# Patient Record
Sex: MALE | Race: WHITE | Employment: OTHER | URBAN - METROPOLITAN AREA
[De-identification: names, ages, dates, MRNs, and addresses within clinical notes are randomized per-mention and may not be internally consistent; named-entity substitution may affect disease eponyms.]

---

## 2023-08-11 ENCOUNTER — TELEPHONE (OUTPATIENT)
Dept: CARDIAC REHAB | Age: 73
End: 2023-08-11

## 2023-08-11 NOTE — TELEPHONE ENCOUNTER
Called  Dada Maria Fernanda for reminder of initial card rehab appointment on 8/14/2023 at 11:15. No answer but left message.

## 2023-08-14 ENCOUNTER — HOSPITAL ENCOUNTER (OUTPATIENT)
Dept: CARDIAC REHAB | Age: 73
Setting detail: THERAPIES SERIES
Discharge: HOME | End: 2023-08-14
Payer: MEDICARE

## 2023-08-14 PROCEDURE — 93798 PHYS/QHP OP CAR RHAB W/ECG: CPT

## 2023-08-14 RX ORDER — GABAPENTIN 100 MG/1
2 CAPSULE ORAL 3 TIMES DAILY
COMMUNITY
Start: 2021-07-22

## 2023-08-14 RX ORDER — GEMFIBROZIL 600 MG/1
600 TABLET, FILM COATED ORAL
COMMUNITY
Start: 2023-05-09

## 2023-08-14 RX ORDER — RANOLAZINE 500 MG/1
500 TABLET, EXTENDED RELEASE ORAL 2 TIMES DAILY
COMMUNITY
Start: 2023-05-09

## 2023-08-14 RX ORDER — ALLOPURINOL 300 MG/1
400 TABLET ORAL
COMMUNITY
Start: 2021-08-30

## 2023-08-14 RX ORDER — FERROUS SULFATE 325(65) MG
TABLET ORAL
COMMUNITY
Start: 2020-11-30

## 2023-08-14 RX ORDER — SPIRONOLACTONE 25 MG/1
1 TABLET ORAL DAILY
COMMUNITY
Start: 2020-09-17

## 2023-08-14 RX ORDER — HYDROCODONE BITARTRATE AND ACETAMINOPHEN 5; 325 MG/1; MG/1
TABLET ORAL
COMMUNITY

## 2023-08-14 RX ORDER — ICOSAPENT ETHYL 1000 MG/1
CAPSULE ORAL
COMMUNITY
Start: 2021-09-10

## 2023-08-14 RX ORDER — AMLODIPINE BESYLATE 5 MG/1
5 TABLET ORAL DAILY
COMMUNITY
Start: 2023-05-09

## 2023-08-14 RX ORDER — M-VIT,TX,IRON,MINS/CALC/FOLIC 27MG-0.4MG
1 TABLET ORAL DAILY
COMMUNITY

## 2023-08-14 RX ORDER — OMEPRAZOLE 20 MG/1
CAPSULE, DELAYED RELEASE ORAL
COMMUNITY
Start: 2018-04-03

## 2023-08-14 RX ORDER — ASPIRIN 81 MG/1
TABLET ORAL
COMMUNITY
Start: 2019-08-28

## 2023-08-14 RX ORDER — ASCORBIC ACID 500 MG
500 TABLET ORAL DAILY
COMMUNITY

## 2023-08-14 RX ORDER — CETIRIZINE HYDROCHLORIDE 10 MG/1
10 TABLET ORAL DAILY
COMMUNITY

## 2023-08-14 SDOH — HEALTH STABILITY: PHYSICAL HEALTH: ON AVERAGE, HOW MANY DAYS PER WEEK DO YOU ENGAGE IN MODERATE TO STRENUOUS EXERCISE (LIKE A BRISK WALK)?: 5 DAYS

## 2023-08-14 SDOH — ECONOMIC STABILITY: HOUSING INSECURITY
IN THE LAST 12 MONTHS, WAS THERE A TIME WHEN YOU DID NOT HAVE A STEADY PLACE TO SLEEP OR SLEPT IN A SHELTER (INCLUDING NOW)?: NO

## 2023-08-14 SDOH — ECONOMIC STABILITY: FOOD INSECURITY: WITHIN THE PAST 12 MONTHS, THE FOOD YOU BOUGHT JUST DIDN'T LAST AND YOU DIDN'T HAVE MONEY TO GET MORE.: NEVER TRUE

## 2023-08-14 SDOH — ECONOMIC STABILITY: TRANSPORTATION INSECURITY
IN THE PAST 12 MONTHS, HAS THE LACK OF TRANSPORTATION KEPT YOU FROM MEDICAL APPOINTMENTS OR FROM GETTING MEDICATIONS?: NO

## 2023-08-14 SDOH — ECONOMIC STABILITY: INCOME INSECURITY: IN THE LAST 12 MONTHS, WAS THERE A TIME WHEN YOU WERE NOT ABLE TO PAY THE MORTGAGE OR RENT ON TIME?: NO

## 2023-08-14 SDOH — ECONOMIC STABILITY: TRANSPORTATION INSECURITY
IN THE PAST 12 MONTHS, HAS LACK OF TRANSPORTATION KEPT YOU FROM MEETINGS, WORK, OR FROM GETTING THINGS NEEDED FOR DAILY LIVING?: NO

## 2023-08-14 SDOH — ECONOMIC STABILITY: FOOD INSECURITY: WITHIN THE PAST 12 MONTHS, YOU WORRIED THAT YOUR FOOD WOULD RUN OUT BEFORE YOU GOT MONEY TO BUY MORE.: NEVER TRUE

## 2023-08-14 SDOH — ECONOMIC STABILITY: HOUSING INSECURITY: IN THE LAST 12 MONTHS, HOW MANY PLACES HAVE YOU LIVED?: 1

## 2023-08-14 SDOH — HEALTH STABILITY: PHYSICAL HEALTH: ON AVERAGE, HOW MANY MINUTES DO YOU ENGAGE IN EXERCISE AT THIS LEVEL?: 30 MIN

## 2023-08-14 ASSESSMENT — SOCIAL DETERMINANTS OF HEALTH (SDOH)
HOW HARD IS IT FOR YOU TO PAY FOR THE VERY BASICS LIKE FOOD, HOUSING, MEDICAL CARE, AND HEATING?: NOT HARD AT ALL
WITHIN THE LAST YEAR, HAVE YOU BEEN HUMILIATED OR EMOTIONALLY ABUSED IN OTHER WAYS BY YOUR PARTNER OR EX-PARTNER?: NO
HOW OFTEN DO YOU ATTEND CHURCH OR RELIGIOUS SERVICES?: MORE THAN 4 TIMES PER YEAR
IN A TYPICAL WEEK, HOW MANY TIMES DO YOU TALK ON THE PHONE WITH FAMILY, FRIENDS, OR NEIGHBORS?: MORE THAN THREE TIMES A WEEK
WITHIN THE LAST YEAR, HAVE YOU BEEN AFRAID OF YOUR PARTNER OR EX-PARTNER?: NO
WITHIN THE LAST YEAR, HAVE YOU BEEN KICKED, HIT, SLAPPED, OR OTHERWISE PHYSICALLY HURT BY YOUR PARTNER OR EX-PARTNER?: NO
HOW OFTEN DO YOU ATTENT MEETINGS OF THE CLUB OR ORGANIZATION YOU BELONG TO?: MORE THAN 4 TIMES PER YEAR
WITHIN THE LAST YEAR, HAVE TO BEEN RAPED OR FORCED TO HAVE ANY KIND OF SEXUAL ACTIVITY BY YOUR PARTNER OR EX-PARTNER?: NO
HOW OFTEN DO YOU GET TOGETHER WITH FRIENDS OR RELATIVES?: MORE THAN THREE TIMES A WEEK
DO YOU BELONG TO ANY CLUBS OR ORGANIZATIONS SUCH AS CHURCH GROUPS UNIONS, FRATERNAL OR ATHLETIC GROUPS, OR SCHOOL GROUPS?: YES

## 2023-08-14 ASSESSMENT — PATIENT HEALTH QUESTIONNAIRE - PHQ9
9. THOUGHTS THAT YOU WOULD BE BETTER OFF DEAD, OR OF HURTING YOURSELF: 0
SUM OF ALL RESPONSES TO PHQ QUESTIONS 1-9: 0
SUM OF ALL RESPONSES TO PHQ QUESTIONS 1-9: 0
3. TROUBLE FALLING OR STAYING ASLEEP: 0
SUM OF ALL RESPONSES TO PHQ QUESTIONS 1-9: 0
8. MOVING OR SPEAKING SO SLOWLY THAT OTHER PEOPLE COULD HAVE NOTICED. OR THE OPPOSITE, BEING SO FIGETY OR RESTLESS THAT YOU HAVE BEEN MOVING AROUND A LOT MORE THAN USUAL: 0
SUM OF ALL RESPONSES TO PHQ9 QUESTIONS 1 & 2: 0
10. IF YOU CHECKED OFF ANY PROBLEMS, HOW DIFFICULT HAVE THESE PROBLEMS MADE IT FOR YOU TO DO YOUR WORK, TAKE CARE OF THINGS AT HOME, OR GET ALONG WITH OTHER PEOPLE: 0
SUM OF ALL RESPONSES TO PHQ QUESTIONS 1-9: 0
6. FEELING BAD ABOUT YOURSELF - OR THAT YOU ARE A FAILURE OR HAVE LET YOURSELF OR YOUR FAMILY DOWN: 0
7. TROUBLE CONCENTRATING ON THINGS, SUCH AS READING THE NEWSPAPER OR WATCHING TELEVISION: 0
1. LITTLE INTEREST OR PLEASURE IN DOING THINGS: 0
2. FEELING DOWN, DEPRESSED OR HOPELESS: 0
5. POOR APPETITE OR OVEREATING: 0
4. FEELING TIRED OR HAVING LITTLE ENERGY: 0

## 2023-08-14 ASSESSMENT — LIFESTYLE VARIABLES
HOW OFTEN DO YOU HAVE A DRINK CONTAINING ALCOHOL: NEVER
HOW MANY STANDARD DRINKS CONTAINING ALCOHOL DO YOU HAVE ON A TYPICAL DAY: PATIENT DOES NOT DRINK
SMOKELESS_TOBACCO: NO

## 2023-08-14 ASSESSMENT — EJECTION FRACTION: EF_VALUE: 65

## 2023-08-14 ASSESSMENT — EXERCISE STRESS TEST
PEAK_HR: 91
PEAK_BP: 148/78

## 2023-08-14 NOTE — PLAN OF CARE
Cardiopulmonary Rehab Treatment Plan   Name: Den Suarez Assessment Date: 2023   : 1950 Primary Diagnosis: Angina    Age: 68 y.o. Referring Physician:  Lea Hobson   MRN: [id number] Primary Care Physician: No primary care provider on file. Treatment Diagnosis  Treatment Diagnosis 1: Angina  Referral Date: 23  Significant Cardiovascular History: Previous CABG; History of heart failure  Co-morbidities: Ulcer disease; Renal disease    Individual Treatment Plan  ITP Visit Type: Initial assessment  1st Date of Exercise : 23  Visit #/Total Visits:   EF%: 65 %  Risk Stratification: Moderate  ITP: Exercise; Psychosocial; Nutrition; Education    Exercise   Brandon Total Score: 15  Test: Six minute walk test    Data Measured Before Walk  Heart Rate: 75  Blood Pressure: 118/78  O2 Saturation: 95  O2 Device: Room air    Data Measured Immediately After Walk  Distance Walked in : ft  Distance Walked (ft): 1265 ft  Peak Heart Rate: 91  Peak Blood Pressure: 148/78  Modified He Scale: 3  RPE: 12  O2 Saturation: 91    Data Measured at 5 Minutes After Walk  Heart Rate: 71  Blood Pressure: 122/78  SpO2: 92 %  O2 Device: Room air    Exercise Prescription  Mode: Track; Treadmill; Bike; Stepper; Ergometer; Elliptical; Rower  Frequency per week: 3 days  Duration Per Session: 20-32+ minutes  Intensity METS      : 2-3  RPE: 11-13  Resistance Training: Yes    Exercise Education  Education: RPE scale; Physically active; Warm up/cool down; Equipment orientation; Signs/symptoms to report;  Exercise safety    Psychosocial  [facility] Total Score: 22  PHQ-9 Total Score: 0  Social Determinants of Health     Tobacco Use: Medium Risk    Smoking Tobacco Use: Former    Smokeless Tobacco Use: Never    Passive Exposure: Not on file   Alcohol Use: Not At Risk    Frequency of Alcohol Consumption: Never    Average Number of Drinks: Patient does not drink    Frequency of Binge Drinking: Never   Financial Resource

## 2023-08-16 ENCOUNTER — HOSPITAL ENCOUNTER (OUTPATIENT)
Dept: CARDIAC REHAB | Age: 73
Setting detail: THERAPIES SERIES
Discharge: HOME | End: 2023-08-16
Payer: MEDICARE

## 2023-08-16 PROCEDURE — 93798 PHYS/QHP OP CAR RHAB W/ECG: CPT

## 2023-08-18 ENCOUNTER — APPOINTMENT (OUTPATIENT)
Dept: CARDIAC REHAB | Age: 73
End: 2023-08-18
Payer: MEDICARE

## 2023-08-18 ENCOUNTER — HOSPITAL ENCOUNTER (OUTPATIENT)
Dept: CARDIAC REHAB | Age: 73
Setting detail: THERAPIES SERIES
Discharge: HOME | End: 2023-08-18
Payer: MEDICARE

## 2023-08-18 PROCEDURE — 93798 PHYS/QHP OP CAR RHAB W/ECG: CPT

## 2023-08-21 ENCOUNTER — HOSPITAL ENCOUNTER (OUTPATIENT)
Dept: CARDIAC REHAB | Age: 73
Setting detail: THERAPIES SERIES
Discharge: HOME | End: 2023-08-21
Payer: MEDICARE

## 2023-08-21 ENCOUNTER — APPOINTMENT (OUTPATIENT)
Dept: CARDIAC REHAB | Age: 73
End: 2023-08-21
Payer: MEDICARE

## 2023-08-21 PROCEDURE — 93798 PHYS/QHP OP CAR RHAB W/ECG: CPT

## 2023-08-23 ENCOUNTER — APPOINTMENT (OUTPATIENT)
Dept: CARDIAC REHAB | Age: 73
End: 2023-08-23
Payer: MEDICARE

## 2023-08-23 ENCOUNTER — HOSPITAL ENCOUNTER (OUTPATIENT)
Dept: CARDIAC REHAB | Age: 73
Setting detail: THERAPIES SERIES
Discharge: HOME | End: 2023-08-23
Payer: MEDICARE

## 2023-08-23 PROCEDURE — 93798 PHYS/QHP OP CAR RHAB W/ECG: CPT

## 2023-08-25 ENCOUNTER — APPOINTMENT (OUTPATIENT)
Dept: CARDIAC REHAB | Age: 73
End: 2023-08-25
Payer: MEDICARE

## 2023-08-25 ENCOUNTER — HOSPITAL ENCOUNTER (OUTPATIENT)
Dept: CARDIAC REHAB | Age: 73
Setting detail: THERAPIES SERIES
Discharge: HOME | End: 2023-08-25
Payer: MEDICARE

## 2023-08-25 VITALS — HEIGHT: 71 IN

## 2023-08-25 PROCEDURE — 93798 PHYS/QHP OP CAR RHAB W/ECG: CPT

## 2023-08-28 ENCOUNTER — APPOINTMENT (OUTPATIENT)
Dept: CARDIAC REHAB | Age: 73
End: 2023-08-28
Payer: MEDICARE

## 2023-08-28 ENCOUNTER — HOSPITAL ENCOUNTER (OUTPATIENT)
Dept: CARDIAC REHAB | Age: 73
Setting detail: THERAPIES SERIES
Discharge: HOME | End: 2023-08-28
Payer: MEDICARE

## 2023-08-28 PROCEDURE — 93798 PHYS/QHP OP CAR RHAB W/ECG: CPT

## 2023-08-30 ENCOUNTER — HOSPITAL ENCOUNTER (OUTPATIENT)
Dept: CARDIAC REHAB | Age: 73
Setting detail: THERAPIES SERIES
Discharge: HOME | End: 2023-08-30
Payer: MEDICARE

## 2023-08-30 ENCOUNTER — APPOINTMENT (OUTPATIENT)
Dept: CARDIAC REHAB | Age: 73
End: 2023-08-30
Payer: MEDICARE

## 2023-08-30 PROCEDURE — 93798 PHYS/QHP OP CAR RHAB W/ECG: CPT

## 2023-09-01 ENCOUNTER — APPOINTMENT (OUTPATIENT)
Dept: CARDIAC REHAB | Age: 73
End: 2023-09-01
Payer: MEDICARE

## 2023-09-01 ENCOUNTER — HOSPITAL ENCOUNTER (OUTPATIENT)
Dept: CARDIAC REHAB | Age: 73
Setting detail: THERAPIES SERIES
Discharge: HOME | End: 2023-09-01
Payer: MEDICARE

## 2023-09-01 PROCEDURE — 93798 PHYS/QHP OP CAR RHAB W/ECG: CPT

## 2023-09-04 ENCOUNTER — APPOINTMENT (OUTPATIENT)
Dept: CARDIAC REHAB | Age: 73
End: 2023-09-04
Payer: MEDICARE

## 2023-09-06 ENCOUNTER — APPOINTMENT (OUTPATIENT)
Dept: CARDIAC REHAB | Age: 73
End: 2023-09-06
Payer: MEDICARE

## 2023-09-08 ENCOUNTER — HOSPITAL ENCOUNTER (OUTPATIENT)
Dept: CARDIAC REHAB | Age: 73
Setting detail: THERAPIES SERIES
Discharge: HOME | End: 2023-09-08
Payer: MEDICARE

## 2023-09-08 ENCOUNTER — APPOINTMENT (OUTPATIENT)
Dept: CARDIAC REHAB | Age: 73
End: 2023-09-08
Payer: MEDICARE

## 2023-09-11 ENCOUNTER — HOSPITAL ENCOUNTER (OUTPATIENT)
Dept: CARDIAC REHAB | Age: 73
Setting detail: THERAPIES SERIES
Discharge: HOME | End: 2023-09-11
Payer: MEDICARE

## 2023-09-11 ENCOUNTER — APPOINTMENT (OUTPATIENT)
Dept: CARDIAC REHAB | Age: 73
End: 2023-09-11
Payer: MEDICARE

## 2023-09-11 PROCEDURE — 93798 PHYS/QHP OP CAR RHAB W/ECG: CPT

## 2023-09-13 ENCOUNTER — HOSPITAL ENCOUNTER (OUTPATIENT)
Dept: CARDIAC REHAB | Age: 73
Setting detail: THERAPIES SERIES
Discharge: HOME | End: 2023-09-13
Payer: MEDICARE

## 2023-09-13 ENCOUNTER — APPOINTMENT (OUTPATIENT)
Dept: CARDIAC REHAB | Age: 73
End: 2023-09-13
Payer: MEDICARE

## 2023-09-13 PROCEDURE — 93798 PHYS/QHP OP CAR RHAB W/ECG: CPT

## 2023-09-15 ENCOUNTER — HOSPITAL ENCOUNTER (OUTPATIENT)
Dept: CARDIAC REHAB | Age: 73
Setting detail: THERAPIES SERIES
Discharge: HOME | End: 2023-09-15
Payer: MEDICARE

## 2023-09-15 ENCOUNTER — APPOINTMENT (OUTPATIENT)
Dept: CARDIAC REHAB | Age: 73
End: 2023-09-15
Payer: MEDICARE

## 2023-09-15 PROCEDURE — 93798 PHYS/QHP OP CAR RHAB W/ECG: CPT

## 2023-09-18 ENCOUNTER — APPOINTMENT (OUTPATIENT)
Dept: CARDIAC REHAB | Age: 73
End: 2023-09-18
Payer: MEDICARE

## 2023-09-18 ENCOUNTER — HOSPITAL ENCOUNTER (OUTPATIENT)
Dept: CARDIAC REHAB | Age: 73
Setting detail: THERAPIES SERIES
Discharge: HOME | End: 2023-09-18
Payer: MEDICARE

## 2023-09-18 PROCEDURE — 93798 PHYS/QHP OP CAR RHAB W/ECG: CPT

## 2023-09-20 ENCOUNTER — APPOINTMENT (OUTPATIENT)
Dept: CARDIAC REHAB | Age: 73
End: 2023-09-20
Payer: MEDICARE

## 2023-09-20 ENCOUNTER — HOSPITAL ENCOUNTER (OUTPATIENT)
Dept: CARDIAC REHAB | Age: 73
Setting detail: THERAPIES SERIES
Discharge: HOME | End: 2023-09-20
Payer: MEDICARE

## 2023-09-20 PROCEDURE — 93798 PHYS/QHP OP CAR RHAB W/ECG: CPT

## 2023-09-22 ENCOUNTER — HOSPITAL ENCOUNTER (OUTPATIENT)
Dept: CARDIAC REHAB | Age: 73
Setting detail: THERAPIES SERIES
Discharge: HOME | End: 2023-09-22
Payer: MEDICARE

## 2023-09-22 ENCOUNTER — APPOINTMENT (OUTPATIENT)
Dept: CARDIAC REHAB | Age: 73
End: 2023-09-22
Payer: MEDICARE

## 2023-09-22 PROCEDURE — 93798 PHYS/QHP OP CAR RHAB W/ECG: CPT

## 2023-09-25 ENCOUNTER — HOSPITAL ENCOUNTER (OUTPATIENT)
Dept: CARDIAC REHAB | Age: 73
Setting detail: THERAPIES SERIES
Discharge: HOME | End: 2023-09-25
Payer: MEDICARE

## 2023-09-25 ENCOUNTER — APPOINTMENT (OUTPATIENT)
Dept: CARDIAC REHAB | Age: 73
End: 2023-09-25
Payer: MEDICARE

## 2023-09-25 PROCEDURE — 93798 PHYS/QHP OP CAR RHAB W/ECG: CPT

## 2023-09-27 ENCOUNTER — APPOINTMENT (OUTPATIENT)
Dept: CARDIAC REHAB | Age: 73
End: 2023-09-27
Payer: MEDICARE

## 2023-09-27 ENCOUNTER — HOSPITAL ENCOUNTER (OUTPATIENT)
Dept: CARDIAC REHAB | Age: 73
Setting detail: THERAPIES SERIES
Discharge: HOME | End: 2023-09-27
Payer: MEDICARE

## 2023-09-27 PROCEDURE — 93798 PHYS/QHP OP CAR RHAB W/ECG: CPT

## 2023-09-28 NOTE — PLAN OF CARE
Risk factors; Understand target guidelines for lipids    Staff Treatment Goals  Goals: Weight management; Exercise  Exercise Goal: 150 minutes of aerobic exercise per week  Exercise Goal Status: Progressing (attends rehab regularly.)  Nutrition Goal: less fattening foods  Nutrition Goal Status: Progressing  Nutrition Goal Comments: 3 servings of vegetables and fruit per day and one serving of fish per week  Weight Managment Goal: 220 lb  Weight Managment Goal Status: Not Met  Weight Management Goal Comments: 5 pounds in 6 weeks (goal 30 lbs weight loss)  Weight Management Goal Assessment: Problems/barriers (frequently eating out.)              Provider Review and Approval of this ITP    The above treatment plan and goals have been set for your patient during Cardiac Rehabilitation.  Please review and Electronically Cosign        Electronically signed by Chriss Odell RN on 9/28/23 at 8:51 AM [facility]

## 2023-09-29 ENCOUNTER — APPOINTMENT (OUTPATIENT)
Dept: CARDIAC REHAB | Age: 73
End: 2023-09-29
Payer: MEDICARE

## 2023-09-29 ENCOUNTER — HOSPITAL ENCOUNTER (OUTPATIENT)
Dept: CARDIAC REHAB | Age: 73
Setting detail: THERAPIES SERIES
Discharge: HOME | End: 2023-09-29
Payer: MEDICARE

## 2023-09-29 PROCEDURE — 93798 PHYS/QHP OP CAR RHAB W/ECG: CPT

## 2023-10-02 ENCOUNTER — HOSPITAL ENCOUNTER (OUTPATIENT)
Dept: CARDIAC REHAB | Age: 73
Setting detail: THERAPIES SERIES
Discharge: HOME | End: 2023-10-02
Payer: MEDICARE

## 2023-10-02 ENCOUNTER — APPOINTMENT (OUTPATIENT)
Dept: CARDIAC REHAB | Age: 73
End: 2023-10-02
Payer: MEDICARE

## 2023-10-02 PROCEDURE — 93798 PHYS/QHP OP CAR RHAB W/ECG: CPT

## 2023-10-04 ENCOUNTER — APPOINTMENT (OUTPATIENT)
Dept: CARDIAC REHAB | Age: 73
End: 2023-10-04
Payer: MEDICARE

## 2023-10-04 ENCOUNTER — HOSPITAL ENCOUNTER (OUTPATIENT)
Dept: CARDIAC REHAB | Age: 73
Setting detail: THERAPIES SERIES
End: 2023-10-04
Payer: MEDICARE

## 2023-10-06 ENCOUNTER — APPOINTMENT (OUTPATIENT)
Dept: CARDIAC REHAB | Age: 73
End: 2023-10-06
Payer: MEDICARE

## 2023-10-06 ENCOUNTER — HOSPITAL ENCOUNTER (OUTPATIENT)
Dept: CARDIAC REHAB | Age: 73
Setting detail: THERAPIES SERIES
Discharge: HOME | End: 2023-10-06
Payer: MEDICARE

## 2023-10-06 PROCEDURE — 93798 PHYS/QHP OP CAR RHAB W/ECG: CPT

## 2023-10-09 ENCOUNTER — APPOINTMENT (OUTPATIENT)
Dept: CARDIAC REHAB | Age: 73
End: 2023-10-09
Payer: MEDICARE

## 2023-10-09 ENCOUNTER — HOSPITAL ENCOUNTER (OUTPATIENT)
Dept: CARDIAC REHAB | Age: 73
Setting detail: THERAPIES SERIES
Discharge: HOME | End: 2023-10-09
Payer: MEDICARE

## 2023-10-09 PROCEDURE — 93798 PHYS/QHP OP CAR RHAB W/ECG: CPT

## 2023-10-11 ENCOUNTER — HOSPITAL ENCOUNTER (OUTPATIENT)
Dept: CARDIAC REHAB | Age: 73
Setting detail: THERAPIES SERIES
Discharge: HOME | End: 2023-10-11
Payer: MEDICARE

## 2023-10-11 ENCOUNTER — APPOINTMENT (OUTPATIENT)
Dept: CARDIAC REHAB | Age: 73
End: 2023-10-11
Payer: MEDICARE

## 2023-10-11 PROCEDURE — 93798 PHYS/QHP OP CAR RHAB W/ECG: CPT

## 2023-10-13 ENCOUNTER — HOSPITAL ENCOUNTER (OUTPATIENT)
Dept: CARDIAC REHAB | Age: 73
Setting detail: THERAPIES SERIES
Discharge: HOME | End: 2023-10-13
Payer: MEDICARE

## 2023-10-13 ENCOUNTER — APPOINTMENT (OUTPATIENT)
Dept: CARDIAC REHAB | Age: 73
End: 2023-10-13
Payer: MEDICARE

## 2023-10-13 PROCEDURE — 93798 PHYS/QHP OP CAR RHAB W/ECG: CPT

## 2023-10-16 ENCOUNTER — HOSPITAL ENCOUNTER (OUTPATIENT)
Dept: CARDIAC REHAB | Age: 73
Setting detail: THERAPIES SERIES
Discharge: HOME | End: 2023-10-16
Payer: MEDICARE

## 2023-10-16 ENCOUNTER — APPOINTMENT (OUTPATIENT)
Dept: CARDIAC REHAB | Age: 73
End: 2023-10-16
Payer: MEDICARE

## 2023-10-16 PROCEDURE — 93798 PHYS/QHP OP CAR RHAB W/ECG: CPT

## 2023-10-18 ENCOUNTER — HOSPITAL ENCOUNTER (OUTPATIENT)
Dept: CARDIAC REHAB | Age: 73
Setting detail: THERAPIES SERIES
Discharge: HOME | End: 2023-10-18
Payer: MEDICARE

## 2023-10-18 ENCOUNTER — APPOINTMENT (OUTPATIENT)
Dept: CARDIAC REHAB | Age: 73
End: 2023-10-18
Payer: MEDICARE

## 2023-10-18 PROCEDURE — 93798 PHYS/QHP OP CAR RHAB W/ECG: CPT

## 2023-10-20 ENCOUNTER — HOSPITAL ENCOUNTER (OUTPATIENT)
Dept: CARDIAC REHAB | Age: 73
Setting detail: THERAPIES SERIES
Discharge: HOME | End: 2023-10-20
Payer: MEDICARE

## 2023-10-20 ENCOUNTER — APPOINTMENT (OUTPATIENT)
Dept: CARDIAC REHAB | Age: 73
End: 2023-10-20
Payer: MEDICARE

## 2023-10-20 PROCEDURE — 93798 PHYS/QHP OP CAR RHAB W/ECG: CPT

## 2023-10-23 ENCOUNTER — APPOINTMENT (OUTPATIENT)
Dept: CARDIAC REHAB | Age: 73
End: 2023-10-23
Payer: MEDICARE

## 2023-10-23 ENCOUNTER — HOSPITAL ENCOUNTER (OUTPATIENT)
Dept: CARDIAC REHAB | Age: 73
Setting detail: THERAPIES SERIES
Discharge: HOME | End: 2023-10-23
Payer: MEDICARE

## 2023-10-23 PROCEDURE — 93798 PHYS/QHP OP CAR RHAB W/ECG: CPT

## 2023-10-23 NOTE — PLAN OF CARE
Cardiopulmonary Rehab Treatment Plan   Name: Sydney Nunez Assessment Date: 10/23/2023   : 1950 Primary Diagnosis: Treatment Diagnosis 1: Angina      Age: 68 y.o. Referring Physician:  Jero Rehmanherb   MRN: [id number] Primary Care Physician: No primary care provider on file. Treatment Diagnosis  Treatment Diagnosis 1: Angina  Referral Date: 23  Significant Cardiovascular History: Previous CABG; History of heart failure  Co-morbidities: Ulcer disease; Renal disease    Individual Treatment Plan  ITP Visit Type: Re-assessment  1st Date of Exercise : 23  ITP Next Review Date: 10/23/23  Visit #/Total Visits: 27/36  EF%: 65 %  Risk Stratification: Moderate  ITP: Exercise; Psychosocial; Nutrition; Education    Exercise   Stages of Change: Action  Assisted Devices: None  Brandon Total Score: 15  Test: Six minute walk test    Data Measured Before Walk  Heart Rate: 75  Blood Pressure: 118/78  O2 Saturation: 95  O2 Device: Room air    Data Measured Immediately After Walk  Distance Walked in : ft  Distance Walked (ft): 1265 ft  Peak Heart Rate: 91  Peak Blood Pressure: 148/78  Modified He Scale: 3  RPE: 12  O2 Saturation: 91    Data Measured at 5 Minutes After Walk  Heart Rate: 71  Blood Pressure: 122/78  SpO2: 92 %  O2 Device: Room air    Exercise Prescription  Mode: Track; Treadmill; Bike; Stepper; Ergometer; Elliptical; Rower  Frequency per week: 2-3 supervised sessions weekly  Duration Per Session: 30-45+ minutes of steady aerobic exercise  Intensity METS      : 3-6 (Increase workloads 0.5-1.0 METS/weekly)  RPE: 12-14  Progression: patient tolerates low levels of exercise well in target frequency, duration, and intensity. Max MET level: 2.8. patient getting closer to goal MET level.   Resistance Training: Yes    Exercise Blood Pressures  Resting BP: 118/64  Is BP WDL: Yes    Exercise Activity at Home  Type: walks  Frequency: daily  Duration: 30 min  Resistance Training: Yes    Exercise

## 2023-10-25 ENCOUNTER — APPOINTMENT (OUTPATIENT)
Dept: CARDIAC REHAB | Age: 73
End: 2023-10-25
Payer: MEDICARE

## 2023-10-25 ENCOUNTER — HOSPITAL ENCOUNTER (OUTPATIENT)
Dept: CARDIAC REHAB | Age: 73
Setting detail: THERAPIES SERIES
Discharge: HOME | End: 2023-10-25
Payer: MEDICARE

## 2023-10-25 PROCEDURE — 93798 PHYS/QHP OP CAR RHAB W/ECG: CPT

## 2023-10-27 ENCOUNTER — APPOINTMENT (OUTPATIENT)
Dept: CARDIAC REHAB | Age: 73
End: 2023-10-27
Payer: MEDICARE

## 2023-10-30 ENCOUNTER — HOSPITAL ENCOUNTER (OUTPATIENT)
Dept: CARDIAC REHAB | Age: 73
Setting detail: THERAPIES SERIES
Discharge: HOME | End: 2023-10-30
Payer: MEDICARE

## 2023-10-30 ENCOUNTER — APPOINTMENT (OUTPATIENT)
Dept: CARDIAC REHAB | Age: 73
End: 2023-10-30
Payer: MEDICARE

## 2023-10-30 PROCEDURE — 93798 PHYS/QHP OP CAR RHAB W/ECG: CPT

## 2023-11-01 ENCOUNTER — HOSPITAL ENCOUNTER (OUTPATIENT)
Dept: CARDIAC REHAB | Age: 73
Setting detail: THERAPIES SERIES
Discharge: HOME | End: 2023-11-01
Payer: MEDICARE

## 2023-11-01 ENCOUNTER — APPOINTMENT (OUTPATIENT)
Dept: CARDIAC REHAB | Age: 73
End: 2023-11-01
Payer: MEDICARE

## 2023-11-01 PROCEDURE — 93798 PHYS/QHP OP CAR RHAB W/ECG: CPT

## 2023-11-03 ENCOUNTER — APPOINTMENT (OUTPATIENT)
Dept: CARDIAC REHAB | Age: 73
End: 2023-11-03
Payer: MEDICARE

## 2023-11-03 ENCOUNTER — HOSPITAL ENCOUNTER (OUTPATIENT)
Dept: CARDIAC REHAB | Age: 73
Setting detail: THERAPIES SERIES
Discharge: HOME | End: 2023-11-03
Payer: MEDICARE

## 2023-11-03 PROCEDURE — 93798 PHYS/QHP OP CAR RHAB W/ECG: CPT

## 2023-11-06 ENCOUNTER — HOSPITAL ENCOUNTER (OUTPATIENT)
Dept: CARDIAC REHAB | Age: 73
Setting detail: THERAPIES SERIES
Discharge: HOME | End: 2023-11-06
Payer: MEDICARE

## 2023-11-06 ENCOUNTER — APPOINTMENT (OUTPATIENT)
Dept: CARDIAC REHAB | Age: 73
End: 2023-11-06
Payer: MEDICARE

## 2023-11-06 PROCEDURE — 93798 PHYS/QHP OP CAR RHAB W/ECG: CPT

## 2023-11-08 ENCOUNTER — APPOINTMENT (OUTPATIENT)
Dept: CARDIAC REHAB | Age: 73
End: 2023-11-08
Payer: MEDICARE

## 2023-11-08 ENCOUNTER — HOSPITAL ENCOUNTER (OUTPATIENT)
Dept: CARDIAC REHAB | Age: 73
Setting detail: THERAPIES SERIES
Discharge: HOME | End: 2023-11-08
Payer: MEDICARE

## 2023-11-08 PROCEDURE — 93798 PHYS/QHP OP CAR RHAB W/ECG: CPT

## 2023-11-10 ENCOUNTER — APPOINTMENT (OUTPATIENT)
Dept: CARDIAC REHAB | Age: 73
End: 2023-11-10
Payer: MEDICARE

## 2023-11-13 ENCOUNTER — APPOINTMENT (OUTPATIENT)
Dept: CARDIAC REHAB | Age: 73
End: 2023-11-13
Payer: MEDICARE

## 2023-11-15 ENCOUNTER — HOSPITAL ENCOUNTER (OUTPATIENT)
Dept: CARDIAC REHAB | Age: 73
Setting detail: THERAPIES SERIES
Discharge: HOME | End: 2023-11-15
Payer: MEDICARE

## 2023-11-15 ENCOUNTER — APPOINTMENT (OUTPATIENT)
Dept: CARDIAC REHAB | Age: 73
End: 2023-11-15
Payer: MEDICARE

## 2023-11-15 PROCEDURE — 93798 PHYS/QHP OP CAR RHAB W/ECG: CPT

## 2023-11-17 ENCOUNTER — APPOINTMENT (OUTPATIENT)
Dept: CARDIAC REHAB | Age: 73
End: 2023-11-17
Payer: MEDICARE

## 2023-11-17 ENCOUNTER — HOSPITAL ENCOUNTER (OUTPATIENT)
Dept: CARDIAC REHAB | Age: 73
Setting detail: THERAPIES SERIES
Discharge: HOME | End: 2023-11-17
Payer: MEDICARE

## 2023-11-17 PROCEDURE — 93798 PHYS/QHP OP CAR RHAB W/ECG: CPT

## 2023-11-17 ASSESSMENT — PATIENT HEALTH QUESTIONNAIRE - PHQ9
SUM OF ALL RESPONSES TO PHQ QUESTIONS 1-9: 0
4. FEELING TIRED OR HAVING LITTLE ENERGY: 0
SUM OF ALL RESPONSES TO PHQ9 QUESTIONS 1 & 2: 0
SUM OF ALL RESPONSES TO PHQ QUESTIONS 1-9: 0
10. IF YOU CHECKED OFF ANY PROBLEMS, HOW DIFFICULT HAVE THESE PROBLEMS MADE IT FOR YOU TO DO YOUR WORK, TAKE CARE OF THINGS AT HOME, OR GET ALONG WITH OTHER PEOPLE: 0
SUM OF ALL RESPONSES TO PHQ QUESTIONS 1-9: 0
6. FEELING BAD ABOUT YOURSELF - OR THAT YOU ARE A FAILURE OR HAVE LET YOURSELF OR YOUR FAMILY DOWN: 0
8. MOVING OR SPEAKING SO SLOWLY THAT OTHER PEOPLE COULD HAVE NOTICED. OR THE OPPOSITE, BEING SO FIGETY OR RESTLESS THAT YOU HAVE BEEN MOVING AROUND A LOT MORE THAN USUAL: 0
7. TROUBLE CONCENTRATING ON THINGS, SUCH AS READING THE NEWSPAPER OR WATCHING TELEVISION: 0
5. POOR APPETITE OR OVEREATING: 0
2. FEELING DOWN, DEPRESSED OR HOPELESS: 0
3. TROUBLE FALLING OR STAYING ASLEEP: 0
9. THOUGHTS THAT YOU WOULD BE BETTER OFF DEAD, OR OF HURTING YOURSELF: 0
SUM OF ALL RESPONSES TO PHQ QUESTIONS 1-9: 0
1. LITTLE INTEREST OR PLEASURE IN DOING THINGS: 0

## 2023-11-20 ENCOUNTER — HOSPITAL ENCOUNTER (OUTPATIENT)
Dept: CARDIAC REHAB | Age: 73
Setting detail: THERAPIES SERIES
Discharge: HOME | End: 2023-11-20
Payer: MEDICARE

## 2023-11-20 ENCOUNTER — APPOINTMENT (OUTPATIENT)
Dept: CARDIAC REHAB | Age: 73
End: 2023-11-20
Payer: MEDICARE

## 2023-11-20 NOTE — CARDIO/PULMONARY
Pt called and stated that they have tested positive for COVID and would not be into pulmonary rehab. Pt stated that their 5 day period would be over 11/21/2023.      Electronically signed by Abdiel Gates on 11/20/2023 at 7:34 AM

## 2023-11-20 NOTE — PLAN OF CARE
Understand target guidelines for B/P  Patient Stated Education Goals: pt states he is compliant with his BP meds and knows how to check BP at home. Patient Education   Education: Risk factors  Hypertension: Yes  Hypertension Controlled: Yes  Is BP WDL: Yes  BP Meds: metoprolol tartrate, spiranolactone    ASA Prescribed: Yes  ASA Adherent: Yes  Antiplatelet Prescribed: No  BB Prescribed: Yes  BB Adherent: Yes  Statins Prescribed: No  Education Target Goals  Target Goals: Risk factors; Understand target guidelines for B/P  Patient Stated Education Goals: pt states he is compliant with his BP meds and knows how to check BP at home. Staff Treatment Goals  Goals: Weight management; Exercise  Exercise Goal: Understand appropriate RPE levels. Exercise Goal Status: Progressing  Nutrition Goal: Discuss label interpretation and begin reading nutrition labels while shopping. Nutrition Goal Status: Progressing  Nutrition Goal Comments: 3 servings of vegetables and fruit per day and one serving of fish per week  Weight Managment Goal: 220 lb  Weight Managment Goal Status: Not Met  Weight Management Goal Comments: 5 pounds in 6 weeks (goal 30 lbs weight loss)  Weight Management Goal Assessment: Problems/barriers              Provider Review and Approval of this ITP    The above treatment plan and goals have been set for your patient during Cardiac Rehabilitation.  Please review and Electronically Cosign        Electronically signed by Omaira Doss RN on 11/20/23 at 8:03 AM EST

## 2023-11-22 ENCOUNTER — HOSPITAL ENCOUNTER (OUTPATIENT)
Dept: CARDIAC REHAB | Age: 73
Setting detail: THERAPIES SERIES
Discharge: HOME | End: 2023-11-22
Payer: MEDICARE

## 2023-11-22 ENCOUNTER — APPOINTMENT (OUTPATIENT)
Dept: CARDIAC REHAB | Age: 73
End: 2023-11-22
Payer: MEDICARE

## 2023-11-24 ENCOUNTER — APPOINTMENT (OUTPATIENT)
Dept: CARDIAC REHAB | Age: 73
End: 2023-11-24
Payer: MEDICARE

## 2023-11-27 ENCOUNTER — HOSPITAL ENCOUNTER (OUTPATIENT)
Dept: CARDIAC REHAB | Age: 73
Setting detail: THERAPIES SERIES
Discharge: HOME | End: 2023-11-27
Payer: MEDICARE

## 2023-11-27 ENCOUNTER — APPOINTMENT (OUTPATIENT)
Dept: CARDIAC REHAB | Age: 73
End: 2023-11-27
Payer: MEDICARE

## 2023-11-27 PROCEDURE — 93798 PHYS/QHP OP CAR RHAB W/ECG: CPT

## 2023-11-27 ASSESSMENT — EXERCISE STRESS TEST
PEAK_RPE: 16
PEAK_HR: 113
PEAK_RPD: 4
PEAK_BP: 164/80

## 2023-11-27 NOTE — PLAN OF CARE
Cardiopulmonary Rehab Treatment Plan   Name: Wyatt Taylor Assessment Date: 2023   : 1950 Primary Diagnosis: Treatment Diagnosis 1: Angina      Age: 68 y.o. Referring Physician:  Vero Powers   MRN: [id number] Primary Care Physician: No primary care provider on file. Treatment Diagnosis  Treatment Diagnosis 1: Angina  Referral Date: 23  Significant Cardiovascular History: Previous CABG; History of heart failure  Co-morbidities: Ulcer disease; Renal disease    Individual Treatment Plan  ITP Visit Type: Discharge, completed program  1st Date of Exercise : 23  ITP Next Review Date: 23  Visit #/Total Visits: 36/36  EF%: 65 %  Risk Stratification: Moderate  ITP: Exercise; Psychosocial; Nutrition; Education    Exercise   Stages of Change: Action  Assisted Devices: None  Brandon Total Score: 15  Test: Six minute walk test    Data Measured Before Walk  Heart Rate: 72  Blood Pressure: 120/60  O2 Saturation: 96  O2 Device: Room air    Data Measured during Walk  Indicate Mode of RPE: 6 minutes  Symptoms: SOB  Peak RPE: 16  Peak RPD: 4    Data Measured Immediately After Walk  Distance Walked in : ft  Distance Walked (ft): 1438 ft  Peak Heart Rate: 113  Peak Blood Pressure: 164/80  Modified He Scale: 4  RPE: 16  O2 Saturation: 91    Data Measured at 5 Minutes After Walk  Heart Rate: 86  Blood Pressure: 132/60  SpO2: 92 %  O2 Device: Room air    Exercise Prescription  Mode: Track; Treadmill; Bike; Stepper; Ergometer; Elliptical; Rower  Frequency per week: 2-3 supervised sessions weekly  Duration Per Session: 30-45+ minutes of steady aerobic exercise  Intensity METS      : 3-6 (Increase workloads 0.5-1.0 METS/weekly)  RPE: 12-14  Progression: patient tolerates low levels of exercise well in target frequency, duration, and intensity. Max MET level: 2.8. patient getting closer to goal MET level.   Resistance Training: Yes    Exercise Blood Pressures  Resting BP: 118/64  Is BP WDL:

## 2023-11-29 ENCOUNTER — APPOINTMENT (OUTPATIENT)
Dept: CARDIAC REHAB | Age: 73
End: 2023-11-29
Payer: MEDICARE